# Patient Record
Sex: FEMALE | Race: WHITE | NOT HISPANIC OR LATINO | Employment: UNEMPLOYED | URBAN - METROPOLITAN AREA
[De-identification: names, ages, dates, MRNs, and addresses within clinical notes are randomized per-mention and may not be internally consistent; named-entity substitution may affect disease eponyms.]

---

## 2018-01-11 ENCOUNTER — OFFICE VISIT (OUTPATIENT)
Dept: OBSTETRICS AND GYNECOLOGY | Facility: CLINIC | Age: 23
End: 2018-01-11

## 2018-01-11 VITALS
SYSTOLIC BLOOD PRESSURE: 116 MMHG | DIASTOLIC BLOOD PRESSURE: 70 MMHG | HEIGHT: 65 IN | BODY MASS INDEX: 24.99 KG/M2 | WEIGHT: 150 LBS

## 2018-01-11 DIAGNOSIS — Z01.419 WELL WOMAN EXAM WITH ROUTINE GYNECOLOGICAL EXAM: Primary | ICD-10-CM

## 2018-01-11 PROCEDURE — 99395 PREV VISIT EST AGE 18-39: CPT | Performed by: OBSTETRICS & GYNECOLOGY

## 2018-01-11 RX ORDER — LEVONORGESTREL / ETHINYL ESTRADIOL AND ETHINYL ESTRADIOL 150-30(84)
1 KIT ORAL DAILY
Qty: 91 EACH | Refills: 3 | Status: SHIPPED | OUTPATIENT
Start: 2018-01-11 | End: 2018-11-06 | Stop reason: SDUPTHER

## 2018-01-11 NOTE — PROGRESS NOTES
"Subjective   Chief Complaint   Patient presents with   • Annual Exam     Mona Crenshaw is a 22 y.o. year old  presenting to be seen for her annual exam.    Current birth control method: OCP (estrogen/progesterone) and condoms.    Patient's last menstrual period was 12/15/2017.    She is sexually active.   Condoms ARE typically used.      Past 6 month menstrual history:    Cycle Frequency: regular, predictable and consistent every 28 - 32 days   Menstrual cycle character: flow is typically normal   Cycle Duration: 5 - 7   Number of heavy days of flows: 2   Intermenstrual bleeding present: {yes about once monthly and she stops the pills for ~ 7 days   Post-coital bleeding present: no     She exercises regularly: yes.  Calcium intake: yes.  She performs self breast exam:not asked.  She has concerns about domestic violence: no.    The following portions of the patient's history were reviewed and updated as appropriate:problem list, current medications, allergies, past family history, past medical history, past social history and past surgical history.    Review of Systems    normal bladder and bowels  Objective   /70  Ht 163.8 cm (64.5\")  Wt 68 kg (150 lb)  LMP 12/15/2017 Comment: 28 cycle days  BMI 25.35 kg/m2     General:  well developed; well nourished  no acute distress  appears stated age   Skin:  No suspicious lesions seen   Thyroid:    Breasts:  Examined in supine position  Symmetric without masses or skin dimpling  Nipples normal without inversion, lesions or discharge  There are no palpable axillary nodes   Abdomen: soft, non-tender; no masses  no umbilical or inginual hernias are present  no hepato-splenomegaly   Pelvis: Clinical staff was present for exam  External genitalia:  normal appearance of the external genitalia including Bartholin's and [place]'s glands.  :  urethral meatus normal; urethral hypermobility is absent.  Vaginal:  normal pink mucosa without prolapse or lesions.  Cervix:  " normal appearance. friable; Slight bleeding with Pap test  Uterus:  normal size, shape and consistency.  Adnexa:  normal bimanual exam of the adnexa.  Rectal:  digital rectal exam not performed; anus visually normal appearing.     Lab Review   No data reviewed and STD swabs for GC, chlamydia and trichimoniasis    Imaging  No data reviewed       Assessment   1. Normal GYN examination sexually active 22-year-old using condoms status post Gardasil vaccination  2. Some breakthrough bleeding on extended dose pills.  She manages this well.  For 7 days.  I suggested she could stop 5 days and restart.  Also suggested some multiple vitamins with iron around that time.     Plan   1. 1000 mg calcium in divided doses ideally in diet; regular exercise  2. Annual exam or when necessary  3. Continue pills and condoms      Medications Rx this encounter:  New Medications Ordered This Visit   Medications   • Levonorgest-Eth Estrad 91-Day (CAMRESE) 0.15-0.03 &0.01 MG tablet     Sig: Take 1 tablet by mouth Daily.     Dispense:  91 each     Refill:  3          This note was electronically signed.    Anderson Dan MD  1/11/2018

## 2018-11-06 RX ORDER — LEVONORGESTREL / ETHINYL ESTRADIOL AND ETHINYL ESTRADIOL 150-30(84)
KIT ORAL
Qty: 28 EACH | Refills: 2 | Status: SHIPPED | OUTPATIENT
Start: 2018-11-06 | End: 2019-03-05 | Stop reason: SDUPTHER

## 2019-03-05 ENCOUNTER — OFFICE VISIT (OUTPATIENT)
Dept: OBSTETRICS AND GYNECOLOGY | Facility: CLINIC | Age: 24
End: 2019-03-05

## 2019-03-05 VITALS
DIASTOLIC BLOOD PRESSURE: 70 MMHG | BODY MASS INDEX: 25.49 KG/M2 | SYSTOLIC BLOOD PRESSURE: 114 MMHG | HEIGHT: 65 IN | WEIGHT: 153 LBS

## 2019-03-05 DIAGNOSIS — Z01.419 ENCOUNTER FOR GYNECOLOGICAL EXAMINATION WITHOUT ABNORMAL FINDING: Primary | ICD-10-CM

## 2019-03-05 PROBLEM — Z82.69 FAMILY HISTORY OF OSTEOPENIA: Status: ACTIVE | Noted: 2019-03-05

## 2019-03-05 PROCEDURE — 99395 PREV VISIT EST AGE 18-39: CPT | Performed by: OBSTETRICS & GYNECOLOGY

## 2019-03-05 RX ORDER — LEVONORGESTREL / ETHINYL ESTRADIOL AND ETHINYL ESTRADIOL 150-30(84)
1 KIT ORAL DAILY
Qty: 28 EACH | Refills: 3 | Status: SHIPPED | OUTPATIENT
Start: 2019-03-05 | End: 2020-05-11 | Stop reason: SDUPTHER

## 2019-03-05 NOTE — PROGRESS NOTES
"Subjective   Chief Complaint   Patient presents with   • Annual Exam     Mona Crenshaw is a 23 y.o. year old  presenting to be seen for her annual exam.    Current birth control method: OCP (estrogen/progesterone).    Patient's last menstrual period was 2019.    She is sexually active.   Condoms are not typically used.    [place] is painful or she is having problems :no  She has concerns about domestic violence: no.    Cycle Frequency: regular every 90 days   Menstrual cycle character: flow is typically light and flow is typically normal   Cycle Duration: 2 - 3   Number of heavy days of flows: 0   Intermenstrual bleeding present: yes   Post-coital bleeding present: no     She exercises regularly: not asked.  Calcium intake: is not adequate.2  Caffeine intake: 2 cups of coffee or equivalent   Self breast awareness:not asked.    Alcohol :regular (heavy) 1+ per day discussed limiting to 7-10/week  Social History    Tobacco Use      Smoking status: Never Smoker      Smokeless tobacco: Never Used      The following portions of the patient's history were reviewed and updated as is  appropriate:problem list, current medications, allergies, past medical history and past surgical history.    Current Outpatient Medications:   •  cetirizine (zyrTEC) 10 MG tablet, Take 10 mg by mouth Daily., Disp: , Rfl:   •  Levonorgest-Eth Estrad 91-Day (CAMRESE) 0.15-0.03 &0.01 MG tablet, Take 1 tablet by mouth Daily. , Disp: 28 each, Rfl: 3    Review of Systems    normal bladder and bowels  Objective   /70   Ht 164.5 cm (64.75\")   Wt 69.4 kg (153 lb)   LMP 2019   Breastfeeding? No   BMI 25.66 kg/m²       General:  well developed; well nourished  no acute distress  appears stated age   Skin:  No suspicious lesions seen   Thyroid:    Breasts:  Examined in supine position  Symmetric without masses or skin dimpling  Nipples normal without inversion, lesions or discharge  There are no palpable axillary " nodes   Abdomen: soft, non-tender; no masses  no umbilical or inguinal hernias are present  no hepato-splenomegaly   Pelvis: Clinical staff was present for exam  External genitalia:  normal appearance of the external genitalia including Bartholin's and [place]'s glands.  :  urethral meatus normal;  Uterus:  normal size, shape and consistency.  Adnexa:  normal bimanual exam of the adnexa.       Lab Review   STD swabs for GC, chlamydia and trichimoniasis and Pap test    Imaging  No data reviewed       Assessment   1. Normal GYN examination doing well on extended dose birth control pills.  Less breakthrough bleeding than last year.  2. Discussed increasing calcium to prevent osteoporosis.  Sounds like her mother may have osteopenia.               Plan   1. 1000 mg calcium in divided doses ideally in diet; regular exercise  2. Self breast awareness and mammogram protocols discussed.  3. Annual examination or sooner as needed        New Medications Ordered This Visit   Medications   • Levonorgest-Eth Estrad 91-Day (CAMRESE) 0.15-0.03 &0.01 MG tablet     Sig: Take 1 tablet by mouth Daily. 200001     Dispense:  28 each     Refill:  3     No orders of the defined types were placed in this encounter.           This note was electronically signed.    Anderson Dan MD  3/5/2019

## 2020-05-11 ENCOUNTER — TELEPHONE (OUTPATIENT)
Dept: OBSTETRICS AND GYNECOLOGY | Facility: CLINIC | Age: 25
End: 2020-05-11

## 2020-05-11 RX ORDER — LEVONORGESTREL / ETHINYL ESTRADIOL AND ETHINYL ESTRADIOL 150-30(84)
1 KIT ORAL DAILY
Qty: 28 EACH | Refills: 0 | Status: SHIPPED | OUTPATIENT
Start: 2020-05-11

## 2020-08-10 RX ORDER — LEVONORGESTREL / ETHINYL ESTRADIOL AND ETHINYL ESTRADIOL 150-30(84)
KIT ORAL
Refills: 0 | OUTPATIENT
Start: 2020-08-10

## 2020-08-13 RX ORDER — LEVONORGESTREL / ETHINYL ESTRADIOL AND ETHINYL ESTRADIOL 150-30(84)
KIT ORAL
Refills: 0 | OUTPATIENT
Start: 2020-08-13